# Patient Record
Sex: MALE | Race: WHITE
[De-identification: names, ages, dates, MRNs, and addresses within clinical notes are randomized per-mention and may not be internally consistent; named-entity substitution may affect disease eponyms.]

---

## 2021-02-03 ENCOUNTER — HOSPITAL ENCOUNTER (OUTPATIENT)
Dept: HOSPITAL 79 - HEART 5 | Age: 66
End: 2021-02-03
Attending: NURSE PRACTITIONER
Payer: MEDICARE

## 2021-02-03 DIAGNOSIS — M79.606: Primary | ICD-10-CM

## 2021-06-06 ENCOUNTER — HOSPITAL ENCOUNTER (INPATIENT)
Dept: HOSPITAL 79 - ER1 | Age: 66
LOS: 6 days | Discharge: HOME | DRG: 871 | End: 2021-06-12
Attending: INTERNAL MEDICINE | Admitting: INTERNAL MEDICINE
Payer: MEDICARE

## 2021-06-06 VITALS — BODY MASS INDEX: 22.53 KG/M2 | HEIGHT: 73 IN | WEIGHT: 170 LBS

## 2021-06-06 DIAGNOSIS — E87.6: ICD-10-CM

## 2021-06-06 DIAGNOSIS — Z79.01: ICD-10-CM

## 2021-06-06 DIAGNOSIS — A40.8: Primary | ICD-10-CM

## 2021-06-06 DIAGNOSIS — K59.00: ICD-10-CM

## 2021-06-06 DIAGNOSIS — J60: ICD-10-CM

## 2021-06-06 DIAGNOSIS — I31.3: ICD-10-CM

## 2021-06-06 DIAGNOSIS — Z84.89: ICD-10-CM

## 2021-06-06 DIAGNOSIS — E78.5: ICD-10-CM

## 2021-06-06 DIAGNOSIS — F17.210: ICD-10-CM

## 2021-06-06 DIAGNOSIS — I48.0: ICD-10-CM

## 2021-06-06 DIAGNOSIS — Z86.73: ICD-10-CM

## 2021-06-06 DIAGNOSIS — I07.1: ICD-10-CM

## 2021-06-06 DIAGNOSIS — Z88.0: ICD-10-CM

## 2021-06-06 DIAGNOSIS — J69.0: ICD-10-CM

## 2021-06-06 DIAGNOSIS — J44.1: ICD-10-CM

## 2021-06-06 DIAGNOSIS — J44.0: ICD-10-CM

## 2021-06-06 DIAGNOSIS — I48.20: ICD-10-CM

## 2021-06-06 DIAGNOSIS — I27.20: ICD-10-CM

## 2021-06-06 DIAGNOSIS — N40.0: ICD-10-CM

## 2021-06-06 DIAGNOSIS — I42.9: ICD-10-CM

## 2021-06-06 DIAGNOSIS — D63.8: ICD-10-CM

## 2021-06-06 DIAGNOSIS — J96.22: ICD-10-CM

## 2021-06-06 DIAGNOSIS — Z79.82: ICD-10-CM

## 2021-06-06 DIAGNOSIS — Z79.899: ICD-10-CM

## 2021-06-06 DIAGNOSIS — G89.4: ICD-10-CM

## 2021-06-06 DIAGNOSIS — Z99.81: ICD-10-CM

## 2021-06-06 DIAGNOSIS — F10.10: ICD-10-CM

## 2021-06-06 DIAGNOSIS — J15.4: ICD-10-CM

## 2021-06-06 DIAGNOSIS — J96.21: ICD-10-CM

## 2021-06-06 DIAGNOSIS — F11.20: ICD-10-CM

## 2021-06-06 DIAGNOSIS — Z20.822: ICD-10-CM

## 2021-06-06 LAB
BUN/CREATININE RATIO: 34 (ref 0–10)
HGB BLD-MCNC: 12.2 GM/DL (ref 14–17.5)
RED BLOOD COUNT: 4.34 M/UL (ref 4.2–5.5)
WHITE BLOOD COUNT: 15 K/UL (ref 4.5–11)

## 2021-06-07 LAB
BUN/CREATININE RATIO: 37 (ref 0–10)
HGB BLD-MCNC: 11 GM/DL (ref 14–17.5)
RED BLOOD COUNT: 3.74 M/UL (ref 4.2–5.5)
STREPTOCOCCUS: DETECTED
WHITE BLOOD COUNT: 11.1 K/UL (ref 4.5–11)

## 2021-06-07 PROCEDURE — 5A0945A ASSISTANCE WITH RESPIRATORY VENTILATION, 24-96 CONSECUTIVE HOURS, HIGH NASAL FLOW/VELOCITY: ICD-10-PCS | Performed by: INTERNAL MEDICINE

## 2021-06-07 NOTE — NUR
@2100- VERIFIED WITH PHARMACY ABOUT DOXYCYCLINE ALERT OF SEVERE REACTION WITH
DIGOXIN ON THE PATIENTS EMAR.
PHARMACY ADVISED TO GET A DIGOXIN LEVEL BEFORE ADMINISTERING
MEDICATION BUT THAT THE PATIENT HAD RECIEVED A DOSE IN THE EMERGENCY
DEPARTMENt.
2115-
CLARIFIED ORDER WITH DR. CERNA WHICH CLARIFIED IT WAS OKAY
TO ADMNISTER MEDICATION NOW AND DRAW DIGOXIN LEVELS WITH MORNING LAB DRAW.
2338- NOTIFIED DR. CERNA OF PATIENT EXPERIENCING TACHYCARDIA UP 'S AND
IN ATRIAL FIBRILATION RHYTHM AND THAT PATIENT HAD A HISTORY OF ATRIAL
FIBRILATION.
HE ORDERED A STAT EKG AND 5MG IV LOPRESSOR EVERY
FIVE MINUTES TIMES THREE DOSES.
0114- NOTIFIED DR. CERNA OF EKG RESULTS SHOWING ATRIAL FIBRILATION WITH RAPID
VENTRICULAR RESPONSE. PT WAS EXPERIENCING TACHYCARDIA -130'S BUT NOT
SUSTAINING THERE.
DR. CERNA ORDERED TO
CONTINUE TO MONITOR THE PATIENT AND TO NOTIFY HIM IF HE SUSTAINED A
TACHYCARDIC 120 OR GREATER BPM.

## 2021-06-08 LAB
BUN/CREATININE RATIO: 33 (ref 0–10)
HGB BLD-MCNC: 10.3 GM/DL (ref 14–17.5)
RED BLOOD COUNT: 3.54 M/UL (ref 4.2–5.5)
WHITE BLOOD COUNT: 9.7 K/UL (ref 4.5–11)

## 2021-06-08 PROCEDURE — B24BZZ4 ULTRASONOGRAPHY OF HEART WITH AORTA, TRANSESOPHAGEAL: ICD-10-PCS | Performed by: INTERNAL MEDICINE

## 2021-06-08 NOTE — NUR
RN NOTIFIED HOUSE SUPERVISOR AND GUPTA CLERK MARY ANN OF NEW ORDERS TO TRANSFER
PATIENT TO ICU. RT CALLED ABOUT NEW ORDERS TO APPLY BIPAP TO PATIENT PER DR. ALYSSIA GOLDEN. APPLIED. PATIENT TOLERATING WELL. PENDING OPEN BED FOR ICU
TRANSFER. MORPHINE ADMINISTERED PER PRN ORDER. PATIENT NOTED TO BE RESTING
COMFORTABLY IN BED WITH WIFE AT BEDSIDE.

## 2021-06-08 NOTE — NUR
RN GAVE DAILY UPDATE ON PATIENT TO PA, MADE HIM AWARE THAT PATIENT HAD BEEN
TACHYCARDIC EARLIER THIS SHIFT AND DR. DALTON WAS AWARE. PA WENT TO PATIENT'S
ROOM AND NOTED THAT HE WAS HAVING SOME DIFFICULTY BREATHING AND NOTIFIED DR. DALTON.

## 2021-06-08 NOTE — NUR
TELEMETRY CALLED RN ABOUT PATIENT HAVING 5 BEAT RUN OF NSVT. RN CONTACTED DR. DALTON. NO NEW ORDERS NOTED.

## 2021-06-08 NOTE — NUR
RN administered 8 am dose of metoprolol for tachycardia. RN notified Dr. Tai
of patient's elevated HR and informed MD that metoprolol had been administered
and patient heart rate had decreased since rest was encouraged and metoprolol
was administered. MD instructed RN to monitor heart rate and notify him if it
didn't continue to decrease.

## 2021-06-09 LAB
BUN/CREATININE RATIO: 27 (ref 0–10)
HGB BLD-MCNC: 10.5 GM/DL (ref 14–17.5)
RED BLOOD COUNT: 3.65 M/UL (ref 4.2–5.5)
WHITE BLOOD COUNT: 15.2 K/UL (ref 4.5–11)

## 2021-06-10 LAB
BUN/CREATININE RATIO: 20 (ref 0–10)
HGB BLD-MCNC: 11.5 GM/DL (ref 14–17.5)
RED BLOOD COUNT: 3.95 M/UL (ref 4.2–5.5)
WHITE BLOOD COUNT: 12.4 K/UL (ref 4.5–11)

## 2021-06-11 LAB
BUN/CREATININE RATIO: 16 (ref 0–10)
HGB BLD-MCNC: 10.4 GM/DL (ref 14–17.5)
RED BLOOD COUNT: 3.64 M/UL (ref 4.2–5.5)
WHITE BLOOD COUNT: 11.9 K/UL (ref 4.5–11)

## 2021-06-11 NOTE — NUR
AT 2212 ON 6/10/21, PT PULLED TELE LEADS AND PULSE OX OFF HIS PERSON. PT
STATES THAT THE PULSE OX "BURNED" HIS FINGER AND THAT THE TELE MONITOR BOX WAS
"SMOTHERING" HIM. PT REFUSED TO WEAR EITHER.

## 2021-06-12 LAB — BUN/CREATININE RATIO: 17 (ref 0–10)

## 2021-07-20 ENCOUNTER — HOSPITAL ENCOUNTER (EMERGENCY)
Dept: HOSPITAL 79 - ER1 | Age: 66
Discharge: HOME | End: 2021-07-20
Payer: MEDICARE

## 2021-07-20 DIAGNOSIS — K92.1: Primary | ICD-10-CM

## 2021-07-20 DIAGNOSIS — K59.00: ICD-10-CM

## 2021-07-20 DIAGNOSIS — Z79.01: ICD-10-CM

## 2021-07-20 DIAGNOSIS — I11.9: ICD-10-CM

## 2021-07-20 DIAGNOSIS — R15.9: ICD-10-CM

## 2021-07-20 DIAGNOSIS — F17.200: ICD-10-CM

## 2021-07-20 DIAGNOSIS — I48.91: ICD-10-CM

## 2021-07-20 DIAGNOSIS — J44.9: ICD-10-CM

## 2021-07-20 DIAGNOSIS — K92.2: ICD-10-CM

## 2021-07-20 LAB
BUN/CREATININE RATIO: 17 (ref 0–10)
HGB BLD-MCNC: 10 GM/DL (ref 14–17.5)
RED BLOOD COUNT: 3.64 M/UL (ref 4.2–5.5)
WHITE BLOOD COUNT: 8.8 K/UL (ref 4.5–11)

## 2022-08-18 ENCOUNTER — HOSPITAL ENCOUNTER (OUTPATIENT)
Dept: HOSPITAL 79 - NM | Age: 67
End: 2022-08-18
Attending: PHYSICIAN ASSISTANT
Payer: MEDICARE

## 2022-08-18 DIAGNOSIS — Z01.810: Primary | ICD-10-CM

## 2022-08-18 DIAGNOSIS — I20.9: ICD-10-CM

## 2022-08-18 PROCEDURE — A9502 TC99M TETROFOSMIN: HCPCS
